# Patient Record
Sex: FEMALE | Race: WHITE | NOT HISPANIC OR LATINO | URBAN - METROPOLITAN AREA
[De-identification: names, ages, dates, MRNs, and addresses within clinical notes are randomized per-mention and may not be internally consistent; named-entity substitution may affect disease eponyms.]

---
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
## 2022-02-11 VITALS
WEIGHT: 197 LBS | DIASTOLIC BLOOD PRESSURE: 92 MMHG | SYSTOLIC BLOOD PRESSURE: 134 MMHG | HEART RATE: 116 BPM | OXYGEN SATURATION: 99 % | TEMPERATURE: 99.6 F

## 2022-02-16 NOTE — PROGRESS NOTES
Chief Complaint    Productive cough, nasal congestion. Watches children that have had pneumonia. Tried several OTC products and they have not been helpful. Has Hx of asthma but not currently on medications.  History of Present Illness      Chief complaint as above reviewed and confirmed with patient.  Pt presents to the clinic with concerns re: cough.  Has had rhinorrhea, congestion and cough for about 1 week.  She has a hx of asthma which has been primarily exercise induced.  Has had some congestion in the chest, productive cough and wheezing at times more at night.  cough keeps her up at night. no vomiting or PTE. no rash. 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  No facial pain or tooth pain.  no HA.  No fevers. Has tried OTC medications which have not been helpful.  no chest pain or MARKS.  no hemoptysis  Review of Systems      Review of systems is negative with the exception of those noted in HPI          Physical Exam   Vitals & Measurements    T: 99.6   F (Tympanic)  HR: 116(Peripheral)  BP: 134/92  SpO2: 99%     WT: 197 lb           Vitals as above per nursing documentation           Constitutional : nad appears well          Ears: ears patent B, TMS intact, noninjected           Nose: nasal mucosa is non-ededmatous. no discharge           Throat: pharynx is nonerythematous, no tonsillar hypertrophy, no exudate           Neck: neck supple, no adenopathy, no thyromegaly, no rigidity           Lungs: lungs : faint expiratory wheezing through out otherwise clear. moderate cough in exam room.           Heart: heart RRR, nl S1, S2 no murmur           skin:  No rashes              Assessment/Plan       Bronchitis (J40)         discussed bronchitis as viral illness.  If worsening or not improving over next 2 weeks with conservative measures, or any increased sob, fevers should be rechecked.  albuterol prn.  tessalon for cough. short course of oral steroids.  Pt instructed to return to clinic  for persistent or worsening symptoms.                    Orders:          [address] office outpatient new 20 minutes (Charge), Quantity: 1, Bronchitis                Orders:         albuterol, 2 puff(s), INH, QID, # 17 g, 2 Refill(s), Type: Maintenance, Pharmacy: [id number], 2 puff(s) Inhale qid, (Ordered)         benzonatate, = 2 cap(s) ( 200 mg ), PO, TID, # 42 cap(s), 0 Refill(s), Type: Maintenance, Pharmacy: [facility], 2 cap(s) Oral tid,x7 day(s), (Ordered)         predniSONE, = 2 tab(s) ( 40 mg ), PO, Daily, # 10 tab(s), 0 Refill(s), Type: Maintenance, Pharmacy: [facility], 2 tab(s) Oral daily,x5 day(s), (Ordered)  Patient Information     Name:EVER DOBBINS      Address:      [address] [place] [id number]     Sex:Female     YOB: 2000     [phone]     MRN:[id number]     FIN:[id number]     Location:[facility]     Date of Service:11/06/2018      Primary Care Physician:       NONE ,       Attending Physician:       Madhuri TORRES, Stacey WILLIAM, [phone]  Problem List/Past Medical History    Ongoing     No qualifying data    Historical     No qualifying data  Medications     albuterol 90 mcg/inh inhalation aerosol: 2 puff(s), INH, QID, 17 g, 2 Refill(s).     predniSONE 20 mg oral tablet: 40 mg, 2 tab(s), PO, Daily, for 5 day(s), 10 tab(s), 0 Refill(s).     Tessalon Perles 100 mg oral capsule: 200 mg, 2 cap(s), PO, TID, for 7 day(s), 42 cap(s), 0 Refill(s).          Allergies    No Known Medication Allergies  Social History    Smoking Status - 11/06/2018     Never smoker

## 2025-01-09 ENCOUNTER — HOSPITAL ENCOUNTER (OUTPATIENT)
Facility: CLINIC | Age: 25
End: 2025-01-09
Attending: ORTHOPAEDIC SURGERY | Admitting: ORTHOPAEDIC SURGERY
Payer: COMMERCIAL

## 2025-03-20 ENCOUNTER — ANESTHESIA EVENT (OUTPATIENT)
Dept: SURGERY | Facility: CLINIC | Age: 25
End: 2025-03-20

## 2025-03-20 RX ORDER — SODIUM CHLORIDE, SODIUM LACTATE, POTASSIUM CHLORIDE, CALCIUM CHLORIDE 600; 310; 30; 20 MG/100ML; MG/100ML; MG/100ML; MG/100ML
INJECTION, SOLUTION INTRAVENOUS CONTINUOUS
OUTPATIENT
Start: 2025-03-20

## 2025-03-20 RX ORDER — FENTANYL CITRATE 50 UG/ML
25-100 INJECTION, SOLUTION INTRAMUSCULAR; INTRAVENOUS
OUTPATIENT
Start: 2025-03-20

## 2025-03-20 RX ORDER — ACETAMINOPHEN 325 MG/1
975 TABLET ORAL ONCE
OUTPATIENT
Start: 2025-03-20 | End: 2025-03-20

## 2025-03-20 RX ORDER — CEFAZOLIN SODIUM/WATER 2 G/20 ML
2 SYRINGE (ML) INTRAVENOUS SEE ADMIN INSTRUCTIONS
OUTPATIENT
Start: 2025-03-21

## 2025-03-20 RX ORDER — FLUMAZENIL 0.1 MG/ML
0.2 INJECTION, SOLUTION INTRAVENOUS
OUTPATIENT
Start: 2025-03-20

## 2025-03-20 RX ORDER — LIDOCAINE 40 MG/G
CREAM TOPICAL
OUTPATIENT
Start: 2025-03-20

## 2025-03-20 RX ORDER — NALOXONE HYDROCHLORIDE 0.4 MG/ML
0.1 INJECTION, SOLUTION INTRAMUSCULAR; INTRAVENOUS; SUBCUTANEOUS
OUTPATIENT
Start: 2025-03-20

## 2025-03-20 RX ORDER — CEFAZOLIN SODIUM/WATER 2 G/20 ML
2 SYRINGE (ML) INTRAVENOUS
OUTPATIENT
Start: 2025-03-21

## 2025-03-21 ENCOUNTER — ANESTHESIA (OUTPATIENT)
Dept: SURGERY | Facility: CLINIC | Age: 25
End: 2025-03-21